# Patient Record
Sex: MALE | Race: WHITE | NOT HISPANIC OR LATINO | Employment: FULL TIME | URBAN - METROPOLITAN AREA
[De-identification: names, ages, dates, MRNs, and addresses within clinical notes are randomized per-mention and may not be internally consistent; named-entity substitution may affect disease eponyms.]

---

## 2023-12-08 ENCOUNTER — TELEPHONE (OUTPATIENT)
Dept: PRIMARY CARE | Facility: CLINIC | Age: 48
End: 2023-12-08
Payer: COMMERCIAL

## 2023-12-08 DIAGNOSIS — F41.9 ANXIETY: ICD-10-CM

## 2023-12-08 RX ORDER — CITALOPRAM 40 MG/1
40 TABLET, FILM COATED ORAL DAILY
COMMUNITY
Start: 2023-09-01 | End: 2023-12-08 | Stop reason: SDUPTHER

## 2023-12-09 RX ORDER — CITALOPRAM 40 MG/1
40 TABLET, FILM COATED ORAL DAILY
Qty: 30 TABLET | Refills: 0 | Status: SHIPPED | OUTPATIENT
Start: 2023-12-09 | End: 2023-12-11 | Stop reason: SDUPTHER

## 2023-12-11 ENCOUNTER — OFFICE VISIT (OUTPATIENT)
Dept: PRIMARY CARE | Facility: CLINIC | Age: 48
End: 2023-12-11
Payer: COMMERCIAL

## 2023-12-11 VITALS
SYSTOLIC BLOOD PRESSURE: 108 MMHG | DIASTOLIC BLOOD PRESSURE: 68 MMHG | BODY MASS INDEX: 26.51 KG/M2 | TEMPERATURE: 98.2 F | HEIGHT: 69 IN | WEIGHT: 179 LBS | HEART RATE: 50 BPM

## 2023-12-11 DIAGNOSIS — F41.9 ANXIETY: ICD-10-CM

## 2023-12-11 PROBLEM — Q24.9 CONGENITAL HEART DISEASE (HHS-HCC): Status: ACTIVE | Noted: 2023-12-11

## 2023-12-11 PROBLEM — J34.2 DEVIATED NASAL SEPTUM: Status: ACTIVE | Noted: 2023-12-11

## 2023-12-11 PROBLEM — F39 MOOD DISORDER (CMS-HCC): Status: ACTIVE | Noted: 2023-12-11

## 2023-12-11 PROBLEM — E55.9 VITAMIN D DEFICIENCY: Status: ACTIVE | Noted: 2023-12-11

## 2023-12-11 PROBLEM — S46.911A STRAIN OF RIGHT SHOULDER: Status: ACTIVE | Noted: 2023-12-11

## 2023-12-11 PROBLEM — J30.9 ALLERGIC RHINITIS: Status: ACTIVE | Noted: 2023-12-11

## 2023-12-11 PROBLEM — E21.3 HYPERPARATHYROIDISM (MULTI): Status: ACTIVE | Noted: 2023-12-11

## 2023-12-11 PROBLEM — R06.83 SNORING: Status: ACTIVE | Noted: 2023-12-11

## 2023-12-11 PROBLEM — G47.33 OBSTRUCTIVE SLEEP APNEA: Status: ACTIVE | Noted: 2023-12-11

## 2023-12-11 PROBLEM — E66.3 OVERWEIGHT WITH BODY MASS INDEX (BMI) OF 25 TO 25.9 IN ADULT: Status: ACTIVE | Noted: 2023-12-11

## 2023-12-11 PROBLEM — Q21.10 ATRIAL SEPTAL DEFECT (HHS-HCC): Status: ACTIVE | Noted: 2023-12-11

## 2023-12-11 PROBLEM — F17.200 CURRENT SMOKER: Status: ACTIVE | Noted: 2023-12-11

## 2023-12-11 PROBLEM — J01.00 ACUTE MAXILLARY SINUSITIS: Status: ACTIVE | Noted: 2023-12-11

## 2023-12-11 PROBLEM — Z87.891 FORMER SMOKER: Status: ACTIVE | Noted: 2023-12-11

## 2023-12-11 PROBLEM — F17.200 CURRENT SMOKER: Status: RESOLVED | Noted: 2023-12-11 | Resolved: 2023-12-11

## 2023-12-11 PROBLEM — R53.83 FATIGUE: Status: ACTIVE | Noted: 2023-12-11

## 2023-12-11 PROCEDURE — 99213 OFFICE O/P EST LOW 20 MIN: CPT | Performed by: FAMILY MEDICINE

## 2023-12-11 RX ORDER — FLUTICASONE PROPIONATE 50 MCG
1 SPRAY, SUSPENSION (ML) NASAL
COMMUNITY

## 2023-12-11 RX ORDER — CITALOPRAM 40 MG/1
40 TABLET, FILM COATED ORAL DAILY
Qty: 90 TABLET | Refills: 1 | Status: SHIPPED | OUTPATIENT
Start: 2023-12-11 | End: 2024-12-10

## 2023-12-11 RX ORDER — BUSPIRONE HYDROCHLORIDE 15 MG/1
15 TABLET ORAL 2 TIMES DAILY
Qty: 60 TABLET | Refills: 2 | Status: SHIPPED | OUTPATIENT
Start: 2023-12-11 | End: 2024-02-26

## 2023-12-11 NOTE — PROGRESS NOTES
"    /68   Pulse 50   Temp 36.8 °C (98.2 °F)   Ht 1.753 m (5' 9\")   Wt 81.2 kg (179 lb)   BMI 26.43 kg/m²     Past Medical History:   Diagnosis Date    Acute upper respiratory infection, unspecified 11/01/2021    Acute URI    Disorder of thyroid, unspecified     Thyroid trouble    Personal history of other diseases of the digestive system     History of gastroesophageal reflux (GERD)    Personal history of other diseases of the nervous system and sense organs     History of earache    Personal history of other diseases of the respiratory system     History of bronchitis    Personal history of other diseases of the respiratory system     History of sore throat    Personal history of other specified conditions     History of heartburn    Personal history of other specified conditions     History of chest pain    Personal history of other specified conditions     History of snoring    Personal history of other specified conditions     History of ulceration       Patient Active Problem List   Diagnosis    Acute maxillary sinusitis    Allergic rhinitis    Atrial septal defect    Congenital heart disease    Deviated nasal septum    Fatigue    Hyperparathyroidism (CMS/HCC)    Mood disorder (CMS/HCC)    Obstructive sleep apnea    Snoring    Strain of right shoulder    Vitamin D deficiency    Former smoker    Overweight with body mass index (BMI) of 25 to 25.9 in adult       Current Outpatient Medications   Medication Sig Dispense Refill    citalopram (CeleXA) 40 mg tablet Take 1 tablet (40 mg) by mouth once daily. 30 tablet 0    fluticasone (Flonase) 50 mcg/actuation nasal spray Administer 1 spray into affected nostril(s) once daily.       No current facility-administered medications for this visit.       CC/HPI/ASSESSMENT/PLAN    Medication    HPI patient with history of anxiety currently using Celexa 40 mg daily.  Patient would like medication if possible to use as needed for increased anxiety episodes.  He " denies fever chills chest pain.      Exam calm lungs CTA CV RRR.  Psych calm pleasant male no psychosis neuro alert oriented no focal neurologic deficits noted    A/P 1 anxiety chronic stable.  Celexa refilled.  BuSpar ordered instructions of use given.  Follow-up 6 months  There are no diagnoses linked to this encounter.

## 2024-04-24 PROBLEM — K63.5 POLYP OF COLON: Status: ACTIVE | Noted: 2023-03-10

## 2024-04-24 PROBLEM — K64.8 HEMORRHOIDS, INTERNAL: Status: ACTIVE | Noted: 2023-03-10

## 2024-04-24 PROBLEM — K21.9 GERD (GASTROESOPHAGEAL REFLUX DISEASE): Status: ACTIVE | Noted: 2024-04-24

## 2024-04-24 RX ORDER — TRIAMCINOLONE ACETONIDE 0.25 MG/G
CREAM TOPICAL
COMMUNITY
Start: 2024-01-25 | End: 2024-04-25 | Stop reason: ALTCHOICE

## 2024-04-24 RX ORDER — CLOBETASOL PROPIONATE 0.5 MG/G
CREAM TOPICAL
COMMUNITY
Start: 2024-03-28

## 2024-04-24 RX ORDER — TRETINOIN 0.25 MG/G
CREAM TOPICAL
COMMUNITY

## 2024-04-25 ENCOUNTER — OFFICE VISIT (OUTPATIENT)
Dept: PRIMARY CARE | Facility: CLINIC | Age: 49
End: 2024-04-25
Payer: COMMERCIAL

## 2024-04-25 VITALS
TEMPERATURE: 98.2 F | HEIGHT: 69 IN | SYSTOLIC BLOOD PRESSURE: 112 MMHG | DIASTOLIC BLOOD PRESSURE: 72 MMHG | WEIGHT: 177 LBS | BODY MASS INDEX: 26.22 KG/M2 | HEART RATE: 50 BPM

## 2024-04-25 DIAGNOSIS — M54.6 THORACIC BACK PAIN, UNSPECIFIED BACK PAIN LATERALITY, UNSPECIFIED CHRONICITY: Primary | ICD-10-CM

## 2024-04-25 PROCEDURE — 99213 OFFICE O/P EST LOW 20 MIN: CPT | Performed by: FAMILY MEDICINE

## 2024-04-25 RX ORDER — METHOCARBAMOL 500 MG/1
500 TABLET, FILM COATED ORAL 2 TIMES DAILY PRN
Qty: 40 TABLET | Refills: 1 | Status: SHIPPED | OUTPATIENT
Start: 2024-04-25 | End: 2024-06-04

## 2024-04-25 RX ORDER — CYCLOBENZAPRINE HCL 10 MG
10 TABLET ORAL NIGHTLY PRN
Qty: 30 TABLET | Refills: 0 | Status: SHIPPED | OUTPATIENT
Start: 2024-04-25 | End: 2024-12-21

## 2024-04-25 ASSESSMENT — PATIENT HEALTH QUESTIONNAIRE - PHQ9
2. FEELING DOWN, DEPRESSED OR HOPELESS: NOT AT ALL
SUM OF ALL RESPONSES TO PHQ9 QUESTIONS 1 AND 2: 0
1. LITTLE INTEREST OR PLEASURE IN DOING THINGS: NOT AT ALL

## 2024-04-25 NOTE — PROGRESS NOTES
"    /72   Pulse 50   Temp 36.8 °C (98.2 °F)   Ht 1.753 m (5' 9\")   Wt 80.3 kg (177 lb)   BMI 26.14 kg/m²     Past Medical History:   Diagnosis Date    Acute upper respiratory infection, unspecified 11/01/2021    Acute URI    Disorder of thyroid, unspecified     Thyroid trouble    Personal history of other diseases of the digestive system     History of gastroesophageal reflux (GERD)    Personal history of other diseases of the nervous system and sense organs     History of earache    Personal history of other diseases of the respiratory system     History of bronchitis    Personal history of other diseases of the respiratory system     History of sore throat    Personal history of other specified conditions     History of heartburn    Personal history of other specified conditions     History of chest pain    Personal history of other specified conditions     History of snoring    Personal history of other specified conditions     History of ulceration       Patient Active Problem List   Diagnosis    Acute maxillary sinusitis    Allergic rhinitis    Atrial septal defect (HHS-HCC)    Congenital heart disease (HHS-HCC)    Deviated nasal septum    Fatigue    Hyperparathyroidism (Multi)    Mood disorder (CMS-HCC)    Obstructive sleep apnea    Snoring    Strain of right shoulder    Vitamin D deficiency    Former smoker    Overweight with body mass index (BMI) of 25 to 25.9 in adult    Anxiety    GERD (gastroesophageal reflux disease)    Hemorrhoids, internal    Polyp of colon       Current Outpatient Medications   Medication Sig Dispense Refill    busPIRone (Buspar) 15 mg tablet take 1 tablet by mouth twice a day 60 tablet 0    citalopram (CeleXA) 40 mg tablet Take 1 tablet (40 mg) by mouth once daily. 90 tablet 1    clobetasol (Temovate) 0.05 % cream apply thin layer to HANDS twice a day until CLEAR. TAPER AS DIREC...  (REFER TO PRESCRIPTION NOTES).      fluticasone (Flonase) 50 mcg/actuation nasal spray " Administer 1 spray into affected nostril(s) once daily.      tretinoin (Retin-A) 0.025 % cream APPLY PEA-SIZED AMOUNT TO THE FULL FACE NIGHTLY; BEGIN WITH EVERY...  (REFER TO PRESCRIPTION NOTES).      triamcinolone (Kenalog) 0.025 % cream APPLY TOPICALLY TO AFFECTED AREA UNDER RIGHT EYE TWICE A DAY FOR UP TO 2 WEEKS       No current facility-administered medications for this visit.       CC/HPI/ASSESSMENT/PLAN    CC back pain    HPI patient notes 1 week ago while exercising on his treadmill he felt sudden sharp pain across the mid Thoracics back area.  He notes he got a little better and then 2 days ago back on the treadmill the pain intensified again.  He notes the pain radiates around and sometimes up towards his neck.  He denies chest pain short of breath fever chills.  Denies direct trauma    Exam calm back exam reveals moderate discomfort with deep palpation of the thoracic musculature near the shoulder blade bilaterally.  Skin no rash    A/P 1 thoracic back pain consistent with musculoskeletal strain.  I recommended stretching and rolling out with an alpha ball.  Muscle relaxer is ordered.  He will continue Aleve 2 twice a day follow-up as needed    There are no diagnoses linked to this encounter.

## 2024-05-11 DIAGNOSIS — F41.9 ANXIETY: ICD-10-CM

## 2024-05-13 RX ORDER — BUSPIRONE HYDROCHLORIDE 15 MG/1
15 TABLET ORAL 2 TIMES DAILY
Qty: 60 TABLET | Refills: 0 | Status: SHIPPED | OUTPATIENT
Start: 2024-05-13

## 2024-06-24 DIAGNOSIS — F41.9 ANXIETY: ICD-10-CM

## 2024-06-24 RX ORDER — CITALOPRAM 40 MG/1
40 TABLET, FILM COATED ORAL DAILY
Qty: 90 TABLET | Refills: 1 | Status: SHIPPED | OUTPATIENT
Start: 2024-06-24

## 2024-10-08 DIAGNOSIS — F41.9 ANXIETY: ICD-10-CM

## 2024-10-08 DIAGNOSIS — J30.9 ALLERGIC RHINITIS, UNSPECIFIED SEASONALITY, UNSPECIFIED TRIGGER: Primary | ICD-10-CM

## 2024-10-14 RX ORDER — BUSPIRONE HYDROCHLORIDE 15 MG/1
15 TABLET ORAL 2 TIMES DAILY
Qty: 60 TABLET | Refills: 0 | Status: SHIPPED | OUTPATIENT
Start: 2024-10-14

## 2024-10-14 RX ORDER — CITALOPRAM 40 MG/1
40 TABLET, FILM COATED ORAL DAILY
Qty: 30 TABLET | Refills: 1 | Status: SHIPPED | OUTPATIENT
Start: 2024-10-14 | End: 2024-12-13

## 2024-10-14 RX ORDER — FLUTICASONE PROPIONATE 50 MCG
1 SPRAY, SUSPENSION (ML) NASAL
Qty: 16 G | Refills: 0 | Status: SHIPPED | OUTPATIENT
Start: 2024-10-14

## 2024-10-14 RX ORDER — CLOBETASOL PROPIONATE 0.5 MG/G
CREAM TOPICAL
OUTPATIENT
Start: 2024-10-14

## 2025-01-03 ENCOUNTER — TELEPHONE (OUTPATIENT)
Dept: PRIMARY CARE | Facility: CLINIC | Age: 50
End: 2025-01-03
Payer: COMMERCIAL

## 2025-01-03 DIAGNOSIS — E66.3 OVERWEIGHT WITH BODY MASS INDEX (BMI) OF 25 TO 25.9 IN ADULT: Primary | ICD-10-CM

## 2025-01-03 DIAGNOSIS — F41.9 ANXIETY: ICD-10-CM

## 2025-01-03 RX ORDER — CITALOPRAM 40 MG/1
40 TABLET, FILM COATED ORAL DAILY
Qty: 30 TABLET | Refills: 1 | Status: SHIPPED | OUTPATIENT
Start: 2025-01-03 | End: 2025-03-04

## 2025-01-06 DIAGNOSIS — F41.9 ANXIETY: ICD-10-CM

## 2025-01-06 RX ORDER — SERTRALINE HYDROCHLORIDE 100 MG/1
TABLET, FILM COATED ORAL
Refills: 0 | OUTPATIENT
Start: 2025-01-06

## 2025-01-06 RX ORDER — FLUOXETINE HYDROCHLORIDE 40 MG/1
CAPSULE ORAL
Refills: 0 | OUTPATIENT
Start: 2025-01-06

## 2025-01-06 RX ORDER — CITALOPRAM 40 MG/1
40 TABLET, FILM COATED ORAL DAILY
Qty: 90 TABLET | Refills: 0 | Status: SHIPPED | OUTPATIENT
Start: 2025-01-06 | End: 2026-01-06

## 2025-03-19 ENCOUNTER — APPOINTMENT (OUTPATIENT)
Dept: PRIMARY CARE | Facility: CLINIC | Age: 50
End: 2025-03-19
Payer: COMMERCIAL

## 2025-03-19 VITALS
DIASTOLIC BLOOD PRESSURE: 72 MMHG | HEART RATE: 56 BPM | HEIGHT: 69 IN | SYSTOLIC BLOOD PRESSURE: 130 MMHG | TEMPERATURE: 97.6 F | WEIGHT: 183 LBS | BODY MASS INDEX: 27.11 KG/M2

## 2025-03-19 DIAGNOSIS — M54.6 THORACIC BACK PAIN, UNSPECIFIED BACK PAIN LATERALITY, UNSPECIFIED CHRONICITY: ICD-10-CM

## 2025-03-19 DIAGNOSIS — R53.83 FATIGUE, UNSPECIFIED TYPE: ICD-10-CM

## 2025-03-19 DIAGNOSIS — Z12.5 ENCOUNTER FOR PROSTATE CANCER SCREENING: ICD-10-CM

## 2025-03-19 DIAGNOSIS — E55.9 VITAMIN D DEFICIENCY: ICD-10-CM

## 2025-03-19 DIAGNOSIS — Z13.220 LIPID SCREENING: ICD-10-CM

## 2025-03-19 DIAGNOSIS — F41.9 ANXIETY: Primary | ICD-10-CM

## 2025-03-19 DIAGNOSIS — E53.8 VITAMIN B12 DEFICIENCY: ICD-10-CM

## 2025-03-19 PROCEDURE — 3008F BODY MASS INDEX DOCD: CPT | Performed by: FAMILY MEDICINE

## 2025-03-19 PROCEDURE — 99214 OFFICE O/P EST MOD 30 MIN: CPT | Performed by: FAMILY MEDICINE

## 2025-03-19 RX ORDER — DESVENLAFAXINE SUCCINATE 25 MG/1
25 TABLET, EXTENDED RELEASE ORAL DAILY
Qty: 30 TABLET | Refills: 3 | Status: SHIPPED | OUTPATIENT
Start: 2025-03-19 | End: 2026-03-19

## 2025-03-19 RX ORDER — ORPHENADRINE CITRATE 100 MG/1
100 TABLET, EXTENDED RELEASE ORAL DAILY
Qty: 30 TABLET | Refills: 1 | Status: SHIPPED | OUTPATIENT
Start: 2025-03-19 | End: 2025-05-18

## 2025-03-19 ASSESSMENT — PATIENT HEALTH QUESTIONNAIRE - PHQ9
1. LITTLE INTEREST OR PLEASURE IN DOING THINGS: NOT AT ALL
2. FEELING DOWN, DEPRESSED OR HOPELESS: SEVERAL DAYS
10. IF YOU CHECKED OFF ANY PROBLEMS, HOW DIFFICULT HAVE THESE PROBLEMS MADE IT FOR YOU TO DO YOUR WORK, TAKE CARE OF THINGS AT HOME, OR GET ALONG WITH OTHER PEOPLE: NOT DIFFICULT AT ALL
SUM OF ALL RESPONSES TO PHQ9 QUESTIONS 1 AND 2: 1

## 2025-03-19 NOTE — PROGRESS NOTES
"    /72   Pulse 56   Temp 36.4 °C (97.6 °F)   Ht 1.753 m (5' 9\")   Wt 83 kg (183 lb)   BMI 27.02 kg/m²     Past Medical History:   Diagnosis Date    Acute upper respiratory infection, unspecified 11/01/2021    Acute URI    Disorder of thyroid, unspecified     Thyroid trouble    Personal history of other diseases of the digestive system     History of gastroesophageal reflux (GERD)    Personal history of other diseases of the nervous system and sense organs     History of earache    Personal history of other diseases of the respiratory system     History of bronchitis    Personal history of other diseases of the respiratory system     History of sore throat    Personal history of other specified conditions     History of heartburn    Personal history of other specified conditions     History of chest pain    Personal history of other specified conditions     History of snoring    Personal history of other specified conditions     History of ulceration       Patient Active Problem List   Diagnosis    Acute maxillary sinusitis    Allergic rhinitis    Atrial septal defect (HHS-HCC)    Congenital heart disease    Deviated nasal septum    Fatigue    Hyperparathyroidism (Multi)    Mood disorder (CMS-HCC)    Obstructive sleep apnea    Snoring    Strain of right shoulder    Vitamin D deficiency    Former smoker    Overweight with body mass index (BMI) of 25 to 25.9 in adult    Anxiety    GERD (gastroesophageal reflux disease)    Hemorrhoids, internal    Polyp of colon    Thoracic back pain       Current Outpatient Medications   Medication Sig Dispense Refill    citalopram (CeleXA) 40 mg tablet Take 1 tablet (40 mg) by mouth once daily. (Patient taking differently: Take 0.5 tablets (20 mg) by mouth once daily.) 90 tablet 0    clobetasol (Temovate) 0.05 % cream apply thin layer to HANDS twice a day until CLEAR. TAPER AS DIREC...  (REFER TO PRESCRIPTION NOTES).      fluticasone (Flonase) 50 mcg/actuation nasal spray " Administer 1 spray into each nostril once daily. 16 g 0    menthol 5 % gel Use topically as directed for pain for 3 to 7 days as needed. Do not apply to open wounds, infections or exfoliative dermatitis.      tretinoin (Retin-A) 0.025 % cream APPLY PEA-SIZED AMOUNT TO THE FULL FACE NIGHTLY; BEGIN WITH EVERY...  (REFER TO PRESCRIPTION NOTES).      busPIRone (Buspar) 15 mg tablet Take 1 tablet (15 mg) by mouth 2 times a day. (Patient not taking: Reported on 3/19/2025) 60 tablet 0    citalopram (CeleXA) 40 mg tablet Take 1 tablet (40 mg) by mouth once daily. 30 tablet 1    cyclobenzaprine (Flexeril) 10 mg tablet Take 1 tablet (10 mg) by mouth as needed at bedtime for muscle spasms. 30 tablet 0    methocarbamol (Robaxin) 500 mg tablet Take 1 tablet (500 mg) by mouth 2 times a day as needed for muscle spasms. 40 tablet 1     No current facility-administered medications for this visit.       CC/HPI/ASSESSMENT/PLAN    CC follow-up medication    HPI patient with a history of anxiety disorder, patient notes he is taking Celexa but does not feel it is working well.  He recently cut the 20 mg daily.  He would like to change to something different.  Patient having a lot of issues with his marriage he notes a very stressful at home.  They have tried marriage counseling.  They have 2 children at home.  Patient notes there are some days where his anxiety is extreme and he asks if there is a medicine that he could take sparingly when he has severe anxiety attacks.  We discussed using Xanax very sparingly.  Patient notes he has had back pain in the upper back area.  He has taken muscle relaxers in the past that helped.  The pain seems to come and go over the last couple months.  We discussed trying a different muscle relaxer as well as using an alpha ball to help massage the back.  Patient will need blood work.  Denies fever chills short of breath abdominal pain.    ROS negative some above past medical social history  reviewed    Exam calm vital stable eyes no jaundice mouth moist neck supple lungs CTA CV RRR.  Psych calm pleasant male no psychosis.  Neuro alert oriented no focal neurologic deficit noted    A/P 1.  Anxiety disorder uncontrolled.  Patient will wean off Celexa over the next week.  He will start Pristiq 25 mg daily.  Follow-up 2 months.  [facility] reports been reviewed.  I did order Xanax No. 30 tablets that patient will use very sparingly for anxiety attacks that he has them.  We discussed counseling and marriage counseling.  Extensive blood work is ordered.  #2 thoracic back pain.  Muscle relaxers are ordered.  We discussed using an alpha ball to massage the back as well as stretching exercises.  Follow-up 2 months    There are no diagnoses linked to this encounter.

## 2025-03-21 ENCOUNTER — TELEPHONE (OUTPATIENT)
Dept: PRIMARY CARE | Facility: CLINIC | Age: 50
End: 2025-03-21
Payer: COMMERCIAL

## 2025-03-21 RX ORDER — ALPRAZOLAM 0.5 MG/1
0.5 TABLET ORAL DAILY PRN
Qty: 30 TABLET | Refills: 0 | Status: SHIPPED | OUTPATIENT
Start: 2025-03-21 | End: 2025-04-20

## 2025-03-21 NOTE — TELEPHONE ENCOUNTER
Pt left message stating that you and him spoke about Xanax and he stated that there was not a prescription to his pharmacy.

## 2025-04-14 ENCOUNTER — APPOINTMENT (OUTPATIENT)
Dept: PRIMARY CARE | Facility: CLINIC | Age: 50
End: 2025-04-14
Payer: COMMERCIAL

## 2025-04-14 VITALS
BODY MASS INDEX: 27.61 KG/M2 | SYSTOLIC BLOOD PRESSURE: 120 MMHG | WEIGHT: 186.4 LBS | DIASTOLIC BLOOD PRESSURE: 78 MMHG | HEART RATE: 47 BPM | TEMPERATURE: 98.3 F | HEIGHT: 69 IN

## 2025-04-14 DIAGNOSIS — Q21.10 ATRIAL SEPTAL DEFECT (HHS-HCC): ICD-10-CM

## 2025-04-14 DIAGNOSIS — F41.9 ANXIETY: Primary | ICD-10-CM

## 2025-04-14 PROCEDURE — 99214 OFFICE O/P EST MOD 30 MIN: CPT | Performed by: FAMILY MEDICINE

## 2025-04-14 PROCEDURE — 3008F BODY MASS INDEX DOCD: CPT | Performed by: FAMILY MEDICINE

## 2025-04-14 NOTE — PROGRESS NOTES
"    /78   Pulse (!) 47   Temp 36.8 °C (98.3 °F)   Ht 1.753 m (5' 9\")   Wt 84.6 kg (186 lb 6.4 oz)   BMI 27.53 kg/m²     Past Medical History:   Diagnosis Date    Acute upper respiratory infection, unspecified 11/01/2021    Acute URI    Disorder of thyroid, unspecified     Thyroid trouble    Personal history of other diseases of the digestive system     History of gastroesophageal reflux (GERD)    Personal history of other diseases of the nervous system and sense organs     History of earache    Personal history of other diseases of the respiratory system     History of bronchitis    Personal history of other diseases of the respiratory system     History of sore throat    Personal history of other specified conditions     History of heartburn    Personal history of other specified conditions     History of chest pain    Personal history of other specified conditions     History of snoring    Personal history of other specified conditions     History of ulceration       Patient Active Problem List   Diagnosis    Acute maxillary sinusitis    Allergic rhinitis    Atrial septal defect (HHS-HCC)    Congenital heart disease    Deviated nasal septum    Fatigue    Hyperparathyroidism (Multi)    Mood disorder (CMS-HCC)    Obstructive sleep apnea    Snoring    Strain of right shoulder    Vitamin D deficiency    Former smoker    Overweight with body mass index (BMI) of 25 to 25.9 in adult    Anxiety    GERD (gastroesophageal reflux disease)    Hemorrhoids, internal    Polyp of colon    Thoracic back pain       Current Outpatient Medications   Medication Sig Dispense Refill    ALPRAZolam (Xanax) 0.5 mg tablet Take 1 tablet (0.5 mg) by mouth once daily as needed for anxiety. 30 tablet 0    clobetasol (Temovate) 0.05 % cream apply thin layer to HANDS twice a day until CLEAR. TAPER AS DIREC...  (REFER TO PRESCRIPTION NOTES).      desvenlafaxine succinate (Pristiq) 25 mg 24 hour tablet Take 1 tablet (25 mg) by mouth once " daily. 30 tablet 3    fluticasone (Flonase) 50 mcg/actuation nasal spray Administer 1 spray into each nostril once daily. 16 g 0    menthol 5 % gel Use topically as directed for pain for 3 to 7 days as needed. Do not apply to open wounds, infections or exfoliative dermatitis.      orphenadrine (Norflex) 100 mg 12 hr tablet Take 1 tablet (100 mg) by mouth once daily. Do not crush, chew, or split. 30 tablet 1    tretinoin (Retin-A) 0.025 % cream APPLY PEA-SIZED AMOUNT TO THE FULL FACE NIGHTLY; BEGIN WITH EVERY...  (REFER TO PRESCRIPTION NOTES).      cyclobenzaprine (Flexeril) 10 mg tablet Take 1 tablet (10 mg) by mouth as needed at bedtime for muscle spasms. 30 tablet 0    methocarbamol (Robaxin) 500 mg tablet Take 1 tablet (500 mg) by mouth 2 times a day as needed for muscle spasms. 40 tablet 1     No current facility-administered medications for this visit.       CC/HPI/ASSESSMENT/PLAN    CC follow-up medication and blood work    HPI patient history of anxiety disorder.  Patient previously taking Celexa which was discontinued recently.  He is currently taking Pristiq 25 mg daily.  He has been on it for approximately 2 and half weeks.  Notes overall anxiety is under reasonable control but feeling slightly more depressed.  We discussed staying with the medicine for couple more weeks and then letting me know by [employer] message whether to increase or not.  Patient with a history of atrial septal defect.  He had surgery years ago to repair it.  He has been feeling well.  He notes he been feeling very fatigued recently.  Denies any fever chest pain palpitation short of breath.  Recent blood work performed reviewed.  Cholesterol borderline elevated liver kidney sugar prostate thyroid levels look good.  All results were reviewed.  ROS negative some above.  Past medical/history reviewed    Exam calm vital stable eyes no jaundice neck supple lungs CTA CV RRR Ext no edema skin no rash.  Psych: Pleasant male no psychosis    A/P  1.  Anxiety disorder.  Patient will continue Pristiq 25 mg daily.  In a couple weeks he will let me know how he is feeling.  If needed will increase to 50 mg daily.  He will follow-up in about 10 weeks.  #2 atrial septal defect chronic stable.  Patient had surgery years ago on and doing well.  Blood work was reviewed.  Blood pressure under good control.  Patient will follow-up 10 weeks.  I spent in excess of 30 minutes with patient more than 50% of the time was spent counseling discussion with regards to his medical illnesses and blood test results as well as medical plan.    There are no diagnoses linked to this encounter.

## 2025-04-18 ENCOUNTER — APPOINTMENT (OUTPATIENT)
Dept: PRIMARY CARE | Facility: CLINIC | Age: 50
End: 2025-04-18
Payer: COMMERCIAL

## 2025-05-22 ENCOUNTER — TELEPHONE (OUTPATIENT)
Dept: OTHER | Age: 50
End: 2025-05-22
Payer: COMMERCIAL

## 2025-05-22 ENCOUNTER — TELEPHONE (OUTPATIENT)
Dept: PRIMARY CARE | Facility: CLINIC | Age: 50
End: 2025-05-22
Payer: COMMERCIAL

## 2025-05-22 DIAGNOSIS — F39 MOOD DISORDER: Primary | ICD-10-CM

## 2025-05-22 NOTE — TELEPHONE ENCOUNTER
Psychiatry referral placed in chart.  Will psychiatry department be contacting patient to schedule appointment?

## 2025-06-23 ENCOUNTER — APPOINTMENT (OUTPATIENT)
Dept: PRIMARY CARE | Facility: CLINIC | Age: 50
End: 2025-06-23
Payer: COMMERCIAL

## 2025-06-23 VITALS
HEART RATE: 51 BPM | WEIGHT: 185 LBS | TEMPERATURE: 98.5 F | HEIGHT: 68 IN | BODY MASS INDEX: 28.04 KG/M2 | SYSTOLIC BLOOD PRESSURE: 130 MMHG | DIASTOLIC BLOOD PRESSURE: 72 MMHG

## 2025-06-23 DIAGNOSIS — B07.0 PLANTAR WART: ICD-10-CM

## 2025-06-23 DIAGNOSIS — F41.9 ANXIETY: Primary | ICD-10-CM

## 2025-06-23 PROCEDURE — 3008F BODY MASS INDEX DOCD: CPT | Performed by: FAMILY MEDICINE

## 2025-06-23 PROCEDURE — 99213 OFFICE O/P EST LOW 20 MIN: CPT | Performed by: FAMILY MEDICINE

## 2025-06-23 RX ORDER — SERTRALINE HYDROCHLORIDE 50 MG/1
TABLET, FILM COATED ORAL
COMMUNITY
Start: 2025-06-19

## 2025-06-23 ASSESSMENT — PATIENT HEALTH QUESTIONNAIRE - PHQ9
2. FEELING DOWN, DEPRESSED OR HOPELESS: NOT AT ALL
1. LITTLE INTEREST OR PLEASURE IN DOING THINGS: NOT AT ALL
SUM OF ALL RESPONSES TO PHQ9 QUESTIONS 1 AND 2: 0

## 2025-06-23 NOTE — PROGRESS NOTES
"    /72   Pulse 51   Temp 36.9 °C (98.5 °F)   Ht 1.727 m (5' 8\")   Wt 83.9 kg (185 lb)   BMI 28.13 kg/m²     Medical History[1]    Problem List[2]    Current Medications[3]    CC/HPI/ASSESSMENT/PLAN    CC follow-up medication    HPI patient with history of anxiety disorder, patient recently started seeing psychiatry, the Pristiq medication was discontinued and he has started Zoloft.  He was given Klonopin.  He has follow-up in the next 2 weeks.  His wife and patient are starting marriage counseling.  Patient notes he has got a bump on the bottom of the foot that is painful when he walks or tries to run.  ROS negative except noted above    Exam calm neck supple lungs CTA CV RRR psych calm pleasant male no psychosis.  Ext exam reveals small wart structure on the bottom of the right foot.  The majority of the callus portion of the wart was shave excised with a #15 scalpel blade.  I recommended patient use over-the-counter wart patches.    A/P 1.  Anxiety stable.  He will continue Zoloft.  Follow-up psychiatry as previously arranged.  #2 plantar wart.  Treatment discussed as above.    There are no diagnoses linked to this encounter.          [1]   Past Medical History:  Diagnosis Date    Acute upper respiratory infection, unspecified 11/01/2021    Acute URI    Disorder of thyroid, unspecified     Thyroid trouble    Personal history of other diseases of the digestive system     History of gastroesophageal reflux (GERD)    Personal history of other diseases of the nervous system and sense organs     History of earache    Personal history of other diseases of the respiratory system     History of bronchitis    Personal history of other diseases of the respiratory system     History of sore throat    Personal history of other specified conditions     History of heartburn    Personal history of other specified conditions     History of chest pain    Personal history of other specified conditions     History of " snoring    Personal history of other specified conditions     History of ulceration   [2]   Patient Active Problem List  Diagnosis    Acute maxillary sinusitis    Allergic rhinitis    Atrial septal defect (HHS-HCC)    Congenital heart disease    Deviated nasal septum    Fatigue    Hyperparathyroidism (Multi)    Mood disorder    Obstructive sleep apnea    Snoring    Strain of right shoulder    Vitamin D deficiency    Former smoker    Overweight with body mass index (BMI) of 25 to 25.9 in adult    Anxiety    GERD (gastroesophageal reflux disease)    Hemorrhoids, internal    Polyp of colon    Thoracic back pain   [3]   Current Outpatient Medications   Medication Sig Dispense Refill    clobetasol (Temovate) 0.05 % cream apply thin layer to HANDS twice a day until CLEAR. TAPER AS DIREC...  (REFER TO PRESCRIPTION NOTES).      fluticasone (Flonase) 50 mcg/actuation nasal spray Administer 1 spray into each nostril once daily. 16 g 0    menthol 5 % gel Use topically as directed for pain for 3 to 7 days as needed. Do not apply to open wounds, infections or exfoliative dermatitis.      sertraline (Zoloft) 50 mg tablet       tretinoin (Retin-A) 0.025 % cream APPLY PEA-SIZED AMOUNT TO THE FULL FACE NIGHTLY; BEGIN WITH EVERY...  (REFER TO PRESCRIPTION NOTES).      ALPRAZolam (Xanax) 0.5 mg tablet Take 1 tablet (0.5 mg) by mouth once daily as needed for anxiety. 30 tablet 0    cyclobenzaprine (Flexeril) 10 mg tablet Take 1 tablet (10 mg) by mouth as needed at bedtime for muscle spasms. 30 tablet 0    desvenlafaxine succinate (Pristiq) 25 mg 24 hour tablet Take 1 tablet (25 mg) by mouth once daily. (Patient not taking: Reported on 6/23/2025) 30 tablet 3    methocarbamol (Robaxin) 500 mg tablet Take 1 tablet (500 mg) by mouth 2 times a day as needed for muscle spasms. 40 tablet 1    orphenadrine (Norflex) 100 mg 12 hr tablet Take 1 tablet (100 mg) by mouth once daily. Do not crush, chew, or split. 30 tablet 1     No current  facility-administered medications for this visit.

## 2025-06-25 PROBLEM — B07.0 PLANTAR WART: Status: ACTIVE | Noted: 2025-06-25

## 2025-08-27 ENCOUNTER — TELEPHONE (OUTPATIENT)
Dept: PRIMARY CARE | Facility: CLINIC | Age: 50
End: 2025-08-27
Payer: COMMERCIAL

## 2025-08-27 DIAGNOSIS — F41.9 ANXIETY: ICD-10-CM
